# Patient Record
Sex: MALE | Race: BLACK OR AFRICAN AMERICAN | NOT HISPANIC OR LATINO | URBAN - METROPOLITAN AREA
[De-identification: names, ages, dates, MRNs, and addresses within clinical notes are randomized per-mention and may not be internally consistent; named-entity substitution may affect disease eponyms.]

---

## 2019-04-22 ENCOUNTER — APPOINTMENT (RX ONLY)
Dept: URBAN - METROPOLITAN AREA CLINIC 125 | Facility: CLINIC | Age: 44
Setting detail: DERMATOLOGY
End: 2019-04-22

## 2019-04-22 DIAGNOSIS — B86 SCABIES: ICD-10-CM

## 2019-04-22 DIAGNOSIS — L72.11 PILAR CYST: ICD-10-CM

## 2019-04-22 DIAGNOSIS — L72.0 EPIDERMAL CYST: ICD-10-CM

## 2019-04-22 PROBLEM — L30.9 DERMATITIS, UNSPECIFIED: Status: ACTIVE | Noted: 2019-04-22

## 2019-04-22 PROBLEM — J45.909 UNSPECIFIED ASTHMA, UNCOMPLICATED: Status: ACTIVE | Noted: 2019-04-22

## 2019-04-22 PROBLEM — I63.50 CEREBRAL INFARCTION DUE TO UNSPECIFIED OCCLUSION OR STENOSIS OF UNSPECIFIED CEREBRAL ARTERY: Status: ACTIVE | Noted: 2019-04-22

## 2019-04-22 PROBLEM — E78.5 HYPERLIPIDEMIA, UNSPECIFIED: Status: ACTIVE | Noted: 2019-04-22

## 2019-04-22 PROBLEM — I10 ESSENTIAL (PRIMARY) HYPERTENSION: Status: ACTIVE | Noted: 2019-04-22

## 2019-04-22 PROCEDURE — ? DIAGNOSIS COMMENT

## 2019-04-22 PROCEDURE — 99202 OFFICE O/P NEW SF 15 MIN: CPT

## 2019-04-22 PROCEDURE — ? DEFER

## 2019-04-22 PROCEDURE — ? COUNSELING

## 2019-04-22 PROCEDURE — ? PRESCRIPTION

## 2019-04-22 PROCEDURE — ? TREATMENT REGIMEN

## 2019-04-22 RX ORDER — PERMETHRIN 50 MG/G
CREAM TOPICAL QD
Qty: 1 | Refills: 1 | Status: ERX | COMMUNITY
Start: 2019-04-22

## 2019-04-22 RX ADMIN — PERMETHRIN: 50 CREAM TOPICAL at 00:00

## 2019-04-22 ASSESSMENT — LOCATION ZONE DERM
LOCATION ZONE: SCALP
LOCATION ZONE: ARM
LOCATION ZONE: EAR

## 2019-04-22 ASSESSMENT — LOCATION DETAILED DESCRIPTION DERM
LOCATION DETAILED: LEFT DISTAL DORSAL FOREARM
LOCATION DETAILED: LEFT OCCIPITAL SCALP
LOCATION DETAILED: RIGHT POSTERIOR EAR
LOCATION DETAILED: RIGHT DISTAL DORSAL FOREARM
LOCATION DETAILED: LEFT POSTERIOR EAR

## 2019-04-22 ASSESSMENT — LOCATION SIMPLE DESCRIPTION DERM
LOCATION SIMPLE: RIGHT FOREARM
LOCATION SIMPLE: POSTERIOR SCALP
LOCATION SIMPLE: LEFT EAR
LOCATION SIMPLE: RIGHT EAR
LOCATION SIMPLE: LEFT FOREARM

## 2019-04-22 NOTE — PROCEDURE: DEFER
Procedure To Be Performed At Next Visit: Excision
Instructions (Optional): Discussed requesting cukturee results from ER, 1.6cm cyst
Introduction Text (Please End With A Colon): The following procedure was deferred:
Detail Level: Detailed

## 2019-05-01 ENCOUNTER — RX ONLY (OUTPATIENT)
Age: 44
Setting detail: RX ONLY
End: 2019-05-01

## 2019-05-01 RX ORDER — DOXYCYCLINE HYCLATE 100 MG/1
CAPSULE, GELATIN COATED ORAL
Qty: 28 | Refills: 0 | Status: ERX | COMMUNITY
Start: 2019-05-01

## 2019-05-13 ENCOUNTER — APPOINTMENT (RX ONLY)
Dept: URBAN - METROPOLITAN AREA CLINIC 125 | Facility: CLINIC | Age: 44
Setting detail: DERMATOLOGY
End: 2019-05-13

## 2019-05-13 DIAGNOSIS — B86 SCABIES: ICD-10-CM | Status: INADEQUATELY CONTROLLED

## 2019-05-13 DIAGNOSIS — L98429 CHRONIC ULCER OF OTHER SPECIFIED SITES: ICD-10-CM

## 2019-05-13 DIAGNOSIS — L73.0 ACNE KELOID: ICD-10-CM

## 2019-05-13 DIAGNOSIS — L98419 CHRONIC ULCER OF OTHER SPECIFIED SITES: ICD-10-CM

## 2019-05-13 PROBLEM — L98.429 NON-PRESSURE CHRONIC ULCER OF BACK WITH UNSPECIFIED SEVERITY: Status: ACTIVE | Noted: 2019-05-13

## 2019-05-13 PROBLEM — L30.9 DERMATITIS, UNSPECIFIED: Status: ACTIVE | Noted: 2019-05-13

## 2019-05-13 PROCEDURE — ? OTHER

## 2019-05-13 PROCEDURE — ? ORDER TESTS

## 2019-05-13 PROCEDURE — 99213 OFFICE O/P EST LOW 20 MIN: CPT | Mod: 25

## 2019-05-13 PROCEDURE — 11900 INJECT SKIN LESIONS </W 7: CPT

## 2019-05-13 PROCEDURE — ? PRESCRIPTION

## 2019-05-13 PROCEDURE — ? COUNSELING

## 2019-05-13 PROCEDURE — ? INTRALESIONAL KENALOG

## 2019-05-13 PROCEDURE — ? TREATMENT REGIMEN

## 2019-05-13 RX ORDER — CLINDAMYCIN PHOSPHATE 10 MG/ML
SOLUTION TOPICAL
Qty: 1 | Refills: 3 | Status: ERX | COMMUNITY
Start: 2019-05-13

## 2019-05-13 RX ORDER — DOXYCYCLINE HYCLATE 100 MG/1
CAPSULE, GELATIN COATED ORAL
Qty: 28 | Refills: 1 | Status: ERX | COMMUNITY
Start: 2019-05-13

## 2019-05-13 RX ADMIN — DOXYCYCLINE HYCLATE: 100 CAPSULE, GELATIN COATED ORAL at 00:00

## 2019-05-13 RX ADMIN — CLINDAMYCIN PHOSPHATE: 10 SOLUTION TOPICAL at 00:00

## 2019-05-13 ASSESSMENT — LOCATION ZONE DERM
LOCATION ZONE: TRUNK
LOCATION ZONE: SCALP

## 2019-05-13 ASSESSMENT — LOCATION DETAILED DESCRIPTION DERM
LOCATION DETAILED: INFERIOR LUMBAR SPINE
LOCATION DETAILED: MID-OCCIPITAL SCALP
LOCATION DETAILED: LEFT INFERIOR OCCIPITAL SCALP

## 2019-05-13 ASSESSMENT — LOCATION SIMPLE DESCRIPTION DERM
LOCATION SIMPLE: POSTERIOR SCALP
LOCATION SIMPLE: LOWER BACK

## 2019-05-13 NOTE — PROCEDURE: INTRALESIONAL KENALOG
Size Of Lesion (Optional): 1.2
Medical Necessity Clause: This procedure was medically necessary because the lesions that were treated were:
Consent: The risks of atrophy were reviewed with the patient at length.
Administered By (Optional): Jason Epps PA-C
Include Z78.9 (Other Specified Conditions Influencing Health Status) As An Associated Diagnosis?: No
Concentration Of Solution Injected (Mg/Ml): 10.0
Total Volume Injected (Ccs- Only Use Numbers And Decimals): 0.4
Lot # (Optional): [id number]
Ndc# For Kenalog Only: 2607-4692-97
X Size Of Lesion In Cm (Optional): 0
Detail Level: Detailed
Expiration Date (Optional): 9/2020
Kenalog Preparation: Kenalog

## 2019-05-13 NOTE — PROCEDURE: ORDER TESTS
Bill For Surgical Tray: no
Expected Date Of Service: 05/13/2019
Billing Type: [employer]
Performing Laboratory: [id number]

## 2019-05-13 NOTE — PROCEDURE: TREATMENT REGIMEN
Otc Regimen: Hibiclens
Detail Level: Zone
Initiate Treatment: Clindamycin solution
Otc Regimen: Cerave anti itch
Discontinue Regimen: Permethrin
Plan: Discuss potential side effects prescribed by PCP/Neurologist which cause pruritus

## 2019-05-20 ENCOUNTER — RX ONLY (OUTPATIENT)
Age: 44
Setting detail: RX ONLY
End: 2019-05-20

## 2019-05-20 RX ORDER — CEPHALEXIN 500 MG/1
CAPSULE ORAL
Qty: 21 | Refills: 0 | Status: ERX | COMMUNITY
Start: 2019-05-20

## 2020-05-04 ENCOUNTER — APPOINTMENT (RX ONLY)
Dept: URBAN - METROPOLITAN AREA CLINIC 124 | Facility: CLINIC | Age: 45
Setting detail: DERMATOLOGY
End: 2020-05-04

## 2020-05-04 DIAGNOSIS — D485 NEOPLASM OF UNCERTAIN BEHAVIOR OF SKIN: ICD-10-CM

## 2020-05-04 DIAGNOSIS — L29.89 OTHER PRURITUS: ICD-10-CM

## 2020-05-04 PROBLEM — L29.8 OTHER PRURITUS: Status: ACTIVE | Noted: 2020-05-04

## 2020-05-04 PROBLEM — Z92.89 PERSONAL HISTORY OF OTHER MEDICAL TREATMENT: Status: ACTIVE | Noted: 2020-05-04

## 2020-05-04 PROBLEM — D48.5 NEOPLASM OF UNCERTAIN BEHAVIOR OF SKIN: Status: ACTIVE | Noted: 2020-05-04

## 2020-05-04 PROCEDURE — 99213 OFFICE O/P EST LOW 20 MIN: CPT | Mod: GT

## 2020-05-04 PROCEDURE — ? CONSENT FOR TELEMEDICINE VISIT OBTAINED

## 2020-05-04 PROCEDURE — ? COUNSELING

## 2020-05-04 PROCEDURE — ? TELEHEALTH ASSESSMENT

## 2020-05-04 PROCEDURE — ? REASON FOR TELEMEDICINE VISIT

## 2020-05-04 PROCEDURE — ? PRESCRIPTION

## 2020-05-04 RX ORDER — TRIAMCINOLONE ACETONIDE 1 MG/G
1 CREAM TOPICAL PRN
Qty: 1 | Refills: 0 | Status: ERX | COMMUNITY
Start: 2020-05-04

## 2020-05-04 RX ADMIN — TRIAMCINOLONE ACETONIDE 1: 1 CREAM TOPICAL at 00:00

## 2020-05-04 ASSESSMENT — LOCATION SIMPLE DESCRIPTION DERM
LOCATION SIMPLE: CHEST
LOCATION SIMPLE: LEFT UPPER BACK
LOCATION SIMPLE: ABDOMEN

## 2020-05-04 ASSESSMENT — LOCATION DETAILED DESCRIPTION DERM
LOCATION DETAILED: EPIGASTRIC SKIN
LOCATION DETAILED: LEFT MID-UPPER BACK
LOCATION DETAILED: RIGHT MEDIAL INFERIOR CHEST

## 2020-05-04 ASSESSMENT — LOCATION ZONE DERM: LOCATION ZONE: TRUNK

## 2020-05-04 NOTE — PROCEDURE: TELEHEALTH ASSESSMENT
Detail Level: Simple
Assessment (Free Text): The patient verified their identity with their photo ID and consented to evaluation and management of their medical condition through telehealth. This visit was conducted in real-time with an audio and video platform, [email or web address] during the [address] during a state of national emergency. This telehealth visit was medically necessary to prevent the community spread of COVID-19. \\n\\nThe patient is aware that we will bill their insurance for this telehealth visit following insurance guidelines. \\n\\nFace to face time with the patient was 15 minutes. \\n\\nConsent:\\n\\nPatient consented to the following prior to the visit: \"I consent and understand that I am initiating a synchronous video health session with my healthcare provider and will be asked to confirm my identity with photo identification. I understand that there are potential risks to this technology, including interruptions, potential data breaches, and technical difficulties. Poor video quality may interfere with my healthcare providerâs ability to accurately diagnose my condition. I understand that my health care provider or I can discontinue the telemedicine consult/visit if it is felt that the videoconferencing connections are not adequate for the situation. I also understand that my insurance carrier will be billed for healthcare services rendered. \" TYPED YES TO CONSENT
Recommendation Preamble: Assessment:

## 2020-05-04 NOTE — PROCEDURE: MIPS QUALITY
Detail Level: Detailed
Additional Notes: patient is not in the age range for pneumonia vaccine and tobacco use 5/2020.
Quality 130: Documentation Of Current Medications In The Medical Record: Current Medications Documented

## 2020-05-21 ENCOUNTER — APPOINTMENT (RX ONLY)
Dept: URBAN - METROPOLITAN AREA CLINIC 125 | Facility: CLINIC | Age: 45
Setting detail: DERMATOLOGY
End: 2020-05-21

## 2020-05-21 DIAGNOSIS — L81.8 OTHER SPECIFIED DISORDERS OF PIGMENTATION: ICD-10-CM

## 2020-05-21 DIAGNOSIS — L81.0 POSTINFLAMMATORY HYPERPIGMENTATION: ICD-10-CM

## 2020-05-21 DIAGNOSIS — L29.89 OTHER PRURITUS: ICD-10-CM

## 2020-05-21 PROBLEM — L29.8 OTHER PRURITUS: Status: ACTIVE | Noted: 2020-05-21

## 2020-05-21 PROCEDURE — ? ORDER TESTS

## 2020-05-21 PROCEDURE — ? TREATMENT REGIMEN

## 2020-05-21 PROCEDURE — 99214 OFFICE O/P EST MOD 30 MIN: CPT

## 2020-05-21 PROCEDURE — ? COUNSELING

## 2020-05-21 PROCEDURE — ? DIAGNOSIS COMMENT

## 2020-05-21 PROCEDURE — ? RECOMMENDATIONS

## 2020-05-21 ASSESSMENT — LOCATION ZONE DERM
LOCATION ZONE: TRUNK
LOCATION ZONE: ARM

## 2020-05-21 ASSESSMENT — LOCATION DETAILED DESCRIPTION DERM
LOCATION DETAILED: RIGHT ANTERIOR SHOULDER
LOCATION DETAILED: LEFT POSTERIOR SHOULDER
LOCATION DETAILED: XIPHOID
LOCATION DETAILED: RIGHT SUPERIOR UPPER BACK
LOCATION DETAILED: LEFT MEDIAL INFERIOR CHEST
LOCATION DETAILED: RIGHT MEDIAL UPPER BACK

## 2020-05-21 ASSESSMENT — LOCATION SIMPLE DESCRIPTION DERM
LOCATION SIMPLE: RIGHT UPPER BACK
LOCATION SIMPLE: CHEST
LOCATION SIMPLE: ABDOMEN
LOCATION SIMPLE: LEFT SHOULDER
LOCATION SIMPLE: RIGHT SHOULDER

## 2020-05-21 NOTE — PROCEDURE: RECOMMENDATIONS
Recommendations (Free Text): Sarna and Zyrtec (pt is going to consult with PCP before taking)
Recommendation Preamble: The following recommendations were made during the visit:
Detail Level: Zone

## 2020-05-21 NOTE — PROCEDURE: TREATMENT REGIMEN
Plan: Pt to consult with an allergist (Dr Talon Henderson) as well as switching personal hygiene products (Cerave, Cetaphil, Aveeno, Vanicream)
Continue Regimen: TAC BID x 2 weeks then d/c x 1 week, prn for flares
Detail Level: Zone

## 2020-05-21 NOTE — PROCEDURE: DIAGNOSIS COMMENT
Detail Level: Zone
Comment: Patient has a history of stroke (2015) and has a history of back injury.  Itching began before the back injury

## 2020-05-21 NOTE — PROCEDURE: ORDER TESTS
Expected Date Of Service: 05/21/2020
Billing Type: [employer]
Performing Laboratory: [id number]
Bill For Surgical Tray: no

## 2022-06-04 ENCOUNTER — TELEPHONE ENCOUNTER (OUTPATIENT)
Dept: URBAN - METROPOLITAN AREA CLINIC 68 | Facility: CLINIC | Age: 47
End: 2022-06-04

## 2022-06-04 RX ORDER — PANTOPRAZOLE SODIUM 40 MG/1
TABLET, DELAYED RELEASE ORAL DAILY
Qty: 90 | Refills: 0 | OUTPATIENT
Start: 2019-10-17 | End: 2020-01-15

## 2022-06-04 RX ORDER — ONDANSETRON 8 MG/1
TABLET ORAL
Qty: 90 | Refills: 0 | OUTPATIENT
Start: 2019-10-29 | End: 2019-11-28

## 2022-06-05 ENCOUNTER — TELEPHONE ENCOUNTER (OUTPATIENT)
Dept: URBAN - METROPOLITAN AREA CLINIC 68 | Facility: CLINIC | Age: 47
End: 2022-06-05

## 2022-06-05 RX ORDER — ZOLPIDEM TARTRATE 10 MG/1
ZOLPIDEM TARTRATE( 10MG ORAL  AT BEDTIME ) ACTIVE -HX ENTRY TABLET, FILM COATED ORAL AT BEDTIME
Status: ACTIVE | COMMUNITY
Start: 2019-10-29

## 2022-06-05 RX ORDER — ROPINIROLE 1 MG/1
ROPINIROLE HCL( 1MG ORAL  AT BEDTIME ) ACTIVE -HX ENTRY TABLET, FILM COATED ORAL AT BEDTIME
Status: ACTIVE | COMMUNITY
Start: 2019-10-29

## 2022-06-05 RX ORDER — DULOXETINE 30 MG/1
DULOXETINE HCL( 30MG ORAL  DAILY ) ACTIVE -HX ENTRY CAPSULE, DELAYED RELEASE PELLETS ORAL DAILY
Status: ACTIVE | COMMUNITY
Start: 2019-10-29

## 2022-06-05 RX ORDER — AMLODIPINE BESYLATE 10 MG/1
AMLODIPINE BESYLATE( 10MG ORAL  DAILY ) ACTIVE -HX ENTRY TABLET ORAL DAILY
Status: ACTIVE | COMMUNITY
Start: 2019-10-29

## 2022-06-05 RX ORDER — HYDRALAZINE HYDROCHLORIDE 25 MG/1
HYDRALAZINE HCL( 25MG ORAL  DAILY ) ACTIVE -HX ENTRY TABLET ORAL DAILY
Status: ACTIVE | COMMUNITY
Start: 2019-10-29

## 2022-06-05 RX ORDER — FLUTICASONE PROPIONATE AND SALMETEROL 50; 250 UG/1; UG/1
ADVAIR DISKUS( 250-50MCG/DOSE INHALATION  THREE TIMES DAILY ) ACTIVE -HX ENTRY POWDER RESPIRATORY (INHALATION)
Status: ACTIVE | COMMUNITY
Start: 2019-10-29

## 2022-06-05 RX ORDER — DIAZEPAM 2 MG/1
DIAZEPAM( 2MG ORAL  THREE TIMES DAILY ) ACTIVE -HX ENTRY TABLET ORAL
Status: ACTIVE | COMMUNITY
Start: 2019-10-29

## 2022-06-05 RX ORDER — ROSUVASTATIN CALCIUM 40 MG/1
ROSUVASTATIN CALCIUM( 40MG ORAL  DAILY ) ACTIVE -HX ENTRY TABLET, FILM COATED ORAL DAILY
Status: ACTIVE | COMMUNITY
Start: 2019-10-29

## 2022-06-05 RX ORDER — MELOXICAM 15 MG/1
MELOXICAM( 15MG ORAL  DAILY ) ACTIVE -HX ENTRY TABLET ORAL DAILY
Status: ACTIVE | COMMUNITY
Start: 2019-10-29

## 2022-06-05 RX ORDER — ACYCLOVIR 800 MG/1
ACYCLOVIR( 800MG ORAL  THREE TIMES DAILY ) ACTIVE -HX ENTRY TABLET ORAL
Status: ACTIVE | COMMUNITY
Start: 2019-10-29

## 2022-06-05 RX ORDER — METOPROLOL TARTRATE 50 MG/1
METOPROLOL TARTRATE( 50MG ORAL  TWO TIMES DAILY ) ACTIVE -HX ENTRY TABLET, FILM COATED ORAL
Status: ACTIVE | COMMUNITY
Start: 2019-10-29

## 2022-06-05 RX ORDER — CYCLOBENZAPRINE HYDROCHLORIDE 10 MG/10MG
CYCLOBENZAPRINE HCL( 10MG ORAL  DAILY ) ACTIVE -HX ENTRY TABLET ORAL DAILY
Status: ACTIVE | COMMUNITY
Start: 2019-10-29

## 2022-06-25 ENCOUNTER — TELEPHONE ENCOUNTER (OUTPATIENT)
Age: 47
End: 2022-06-25

## 2022-06-25 RX ORDER — PANTOPRAZOLE 40 MG/1
TABLET, DELAYED RELEASE ORAL DAILY
Qty: 90 | Refills: 0 | OUTPATIENT
Start: 2019-10-17 | End: 2020-01-15

## 2022-06-25 RX ORDER — ONDANSETRON 8 MG/1
TABLET ORAL
Qty: 90 | Refills: 0 | OUTPATIENT
Start: 2019-10-29 | End: 2019-11-28

## 2022-06-26 ENCOUNTER — TELEPHONE ENCOUNTER (OUTPATIENT)
Age: 47
End: 2022-06-26

## 2022-06-26 RX ORDER — ALBUTEROL SULFATE 90 UG/1
ALBUTEROL( 90MCG/ACT INHALATION   ) ACTIVE -HX ENTRY INHALANT RESPIRATORY (INHALATION)
Status: ACTIVE | COMMUNITY
Start: 2019-10-29

## 2022-06-26 RX ORDER — CYCLOBENZAPRINE HYDROCHLORIDE 10 MG/1
CYCLOBENZAPRINE HCL( 10MG ORAL  DAILY ) ACTIVE -HX ENTRY TABLET, FILM COATED ORAL DAILY
Status: ACTIVE | COMMUNITY
Start: 2019-10-29

## 2022-06-26 RX ORDER — CHLORHEXIDINE GLUCONATE 4 %
FERROUS SULFATE( 325 (65 FE)MG ORAL  DAILY ) ACTIVE -HX ENTRY LIQUID (ML) TOPICAL DAILY
Status: ACTIVE | COMMUNITY
Start: 2019-10-29

## 2022-06-26 RX ORDER — DIAZEPAM 2 MG/1
DIAZEPAM( 2MG ORAL  THREE TIMES DAILY ) ACTIVE -HX ENTRY TABLET ORAL
Status: ACTIVE | COMMUNITY
Start: 2019-10-29

## 2022-06-26 RX ORDER — ROSUVASTATIN CALCIUM 40 MG/1
ROSUVASTATIN CALCIUM( 40MG ORAL  DAILY ) ACTIVE -HX ENTRY TABLET, FILM COATED ORAL DAILY
Status: ACTIVE | COMMUNITY
Start: 2019-10-29

## 2022-06-26 RX ORDER — HYDRALAZINE HYDROCHLORIDE 25 MG/1
HYDRALAZINE HCL( 25MG ORAL  DAILY ) ACTIVE -HX ENTRY TABLET ORAL DAILY
Status: ACTIVE | COMMUNITY
Start: 2019-10-29

## 2022-06-26 RX ORDER — ACYCLOVIR 800 MG/1
ACYCLOVIR( 800MG ORAL  THREE TIMES DAILY ) ACTIVE -HX ENTRY TABLET ORAL
Status: ACTIVE | COMMUNITY
Start: 2019-10-29

## 2022-06-26 RX ORDER — ROPINIROLE 1 MG/1
ROPINIROLE HCL( 1MG ORAL  AT BEDTIME ) ACTIVE -HX ENTRY TABLET, FILM COATED ORAL AT BEDTIME
Status: ACTIVE | COMMUNITY
Start: 2019-10-29

## 2022-06-26 RX ORDER — MELOXICAM 15 MG/1
MELOXICAM( 15MG ORAL  DAILY ) ACTIVE -HX ENTRY TABLET ORAL DAILY
Status: ACTIVE | COMMUNITY
Start: 2019-10-29

## 2022-06-26 RX ORDER — DULOXETINE HYDROCHLORIDE 30 MG/1
DULOXETINE HCL( 30MG ORAL  DAILY ) ACTIVE -HX ENTRY CAPSULE, DELAYED RELEASE PELLETS ORAL DAILY
Status: ACTIVE | COMMUNITY
Start: 2019-10-29

## 2022-06-26 RX ORDER — AMLODIPINE BESYLATE 10 MG/1
AMLODIPINE BESYLATE( 10MG ORAL  DAILY ) ACTIVE -HX ENTRY TABLET ORAL DAILY
Status: ACTIVE | COMMUNITY
Start: 2019-10-29

## 2022-06-26 RX ORDER — ZOLPIDEM TARTRATE 10 MG/1
ZOLPIDEM TARTRATE( 10MG ORAL  AT BEDTIME ) ACTIVE -HX ENTRY TABLET ORAL AT BEDTIME
Status: ACTIVE | COMMUNITY
Start: 2019-10-29

## 2022-06-26 RX ORDER — METOPROLOL TARTRATE 50 MG/1
METOPROLOL TARTRATE( 50MG ORAL  TWO TIMES DAILY ) ACTIVE -HX ENTRY TABLET, FILM COATED ORAL
Status: ACTIVE | COMMUNITY
Start: 2019-10-29
